# Patient Record
Sex: MALE | Race: WHITE
[De-identification: names, ages, dates, MRNs, and addresses within clinical notes are randomized per-mention and may not be internally consistent; named-entity substitution may affect disease eponyms.]

---

## 2017-03-14 ENCOUNTER — HOSPITAL ENCOUNTER (OUTPATIENT)
Dept: HOSPITAL 61 - PCVCCLINIC | Age: 72
Discharge: HOME | End: 2017-03-14
Attending: INTERNAL MEDICINE
Payer: MEDICARE

## 2017-03-14 DIAGNOSIS — I25.10: Primary | ICD-10-CM

## 2017-03-14 DIAGNOSIS — E78.5: ICD-10-CM

## 2017-03-14 DIAGNOSIS — I10: ICD-10-CM

## 2017-03-14 DIAGNOSIS — E11.9: ICD-10-CM

## 2017-03-14 DIAGNOSIS — M19.90: ICD-10-CM

## 2017-03-14 DIAGNOSIS — I65.29: ICD-10-CM

## 2017-03-14 PROCEDURE — G0463 HOSPITAL OUTPT CLINIC VISIT: HCPCS

## 2017-03-14 PROCEDURE — 93005 ELECTROCARDIOGRAM TRACING: CPT

## 2017-09-26 ENCOUNTER — HOSPITAL ENCOUNTER (OUTPATIENT)
Dept: HOSPITAL 61 - PCVCCLINIC | Age: 72
Discharge: HOME | End: 2017-09-26
Attending: INTERNAL MEDICINE
Payer: MEDICARE

## 2017-09-26 DIAGNOSIS — Z77.098: ICD-10-CM

## 2017-09-26 DIAGNOSIS — I10: ICD-10-CM

## 2017-09-26 DIAGNOSIS — I65.23: ICD-10-CM

## 2017-09-26 DIAGNOSIS — Z79.84: ICD-10-CM

## 2017-09-26 DIAGNOSIS — E11.9: ICD-10-CM

## 2017-09-26 DIAGNOSIS — I25.10: Primary | ICD-10-CM

## 2017-09-26 DIAGNOSIS — M19.90: ICD-10-CM

## 2017-09-26 DIAGNOSIS — Z79.899: ICD-10-CM

## 2017-09-26 DIAGNOSIS — Z87.891: ICD-10-CM

## 2017-09-26 DIAGNOSIS — I77.9: ICD-10-CM

## 2017-09-26 DIAGNOSIS — E78.2: ICD-10-CM

## 2017-09-26 DIAGNOSIS — I73.00: ICD-10-CM

## 2017-09-26 DIAGNOSIS — Z79.82: ICD-10-CM

## 2017-09-26 PROCEDURE — G0463 HOSPITAL OUTPT CLINIC VISIT: HCPCS

## 2017-09-26 PROCEDURE — 93005 ELECTROCARDIOGRAM TRACING: CPT

## 2018-03-27 ENCOUNTER — HOSPITAL ENCOUNTER (OUTPATIENT)
Dept: HOSPITAL 61 - PCVCCLINIC | Age: 73
Discharge: HOME | End: 2018-03-27
Attending: INTERNAL MEDICINE
Payer: MEDICARE

## 2018-03-27 DIAGNOSIS — I25.119: Primary | ICD-10-CM

## 2018-03-27 DIAGNOSIS — Z79.899: ICD-10-CM

## 2018-03-27 DIAGNOSIS — I10: ICD-10-CM

## 2018-03-27 DIAGNOSIS — Z79.84: ICD-10-CM

## 2018-03-27 DIAGNOSIS — Z87.891: ICD-10-CM

## 2018-03-27 DIAGNOSIS — E11.9: ICD-10-CM

## 2018-03-27 DIAGNOSIS — I65.23: ICD-10-CM

## 2018-03-27 DIAGNOSIS — E78.2: ICD-10-CM

## 2018-03-27 DIAGNOSIS — Z79.82: ICD-10-CM

## 2018-03-27 DIAGNOSIS — I73.00: ICD-10-CM

## 2018-03-27 DIAGNOSIS — Z77.098: ICD-10-CM

## 2018-03-27 PROCEDURE — 80061 LIPID PANEL: CPT

## 2018-03-27 PROCEDURE — 93005 ELECTROCARDIOGRAM TRACING: CPT

## 2018-10-25 ENCOUNTER — HOSPITAL ENCOUNTER (OUTPATIENT)
Dept: HOSPITAL 61 - PCVCIMAG | Age: 73
Discharge: HOME | End: 2018-10-25
Attending: INTERNAL MEDICINE
Payer: MEDICARE

## 2018-10-25 DIAGNOSIS — I25.10: Primary | ICD-10-CM

## 2018-10-25 DIAGNOSIS — Z77.098: ICD-10-CM

## 2018-10-25 DIAGNOSIS — E11.9: ICD-10-CM

## 2018-10-25 DIAGNOSIS — I73.00: ICD-10-CM

## 2018-10-25 DIAGNOSIS — Z79.82: ICD-10-CM

## 2018-10-25 DIAGNOSIS — I65.23: ICD-10-CM

## 2018-10-25 DIAGNOSIS — Z79.84: ICD-10-CM

## 2018-10-25 DIAGNOSIS — I10: ICD-10-CM

## 2018-10-25 DIAGNOSIS — Z79.899: ICD-10-CM

## 2018-10-25 DIAGNOSIS — E78.2: ICD-10-CM

## 2018-10-25 DIAGNOSIS — Z87.891: ICD-10-CM

## 2018-10-25 PROCEDURE — 93017 CV STRESS TEST TRACING ONLY: CPT

## 2018-10-25 PROCEDURE — G0463 HOSPITAL OUTPT CLINIC VISIT: HCPCS

## 2018-10-25 PROCEDURE — 78452 HT MUSCLE IMAGE SPECT MULT: CPT

## 2018-10-25 PROCEDURE — 93005 ELECTROCARDIOGRAM TRACING: CPT

## 2018-10-25 PROCEDURE — A9500 TC99M SESTAMIBI: HCPCS

## 2018-10-25 NOTE — PCVCIMAG
--------------- APPROVED REPORT --------------





Imaging Protocol: Rest Tc-99m/Stress Tc-99m 1 day

Study performed:  10/25/2018 13:34:30



Indication: CAD

Patient Location: Out-Patient

Stress Nurse: Dayanna Perea RN

NM Tech:MARIANELA Casey



Ht: 5 ft 10 in Wt: 215 lbs BSA:  2.15 m2

HR: 78 bpm                      BP: 132/76 mmHg         BMI:  

30.8

Rhythm:  Sinus Rhythm, ST & T abnormalities



Medical History

Medications: Amlodipine, ASA, Atorvastatin, Lisinopril, Metformin, 

Metoprolol, Omeprazole

Allergies: No known drug allergies

Cardiac Risk Factors: Age, HTN, Hyperlipidemia, DM, Tobacco History 

(Former)

Previous Cardiac Procedures: PCI - Circ

Pretest Chest Pain Characteristics: No chest pain

Physical Disabilities: Breathing, patient refuses Zachary protocol

Meds Held (24 hrs): Metoprolol



Resting Data

Rest SPECT myocardial perfusion imaging was performed in supine 

position 45 minutes following the intravenous injection of 11.8 mCi 

of Tc-99m Sestamibi.

Time of rest injection: 1250     Date: 10/25/2018

Administration Route: IV

Administration Site: Right AC



Pharmacologic Stress

Pharmacologic stress test was performed by injecting Regadenoson 0.4 

mg IV push over 10-15 seconds immediately followed by the intravenous 

injection of 32.8 mCi of Tc-99m Sestamibi.

Time of stress injection: 1410     Date: 10/25/2018

Administration Route: IV

Administration Site: Right AC

Gated Stress SPECT was performed 45 minutes after stress 

injection.

The images were gated to evaluate regional wall motion and calculate 

left ventricular ejection fraction. 



Stress Test Details

Stress Test:  Pharmacologic stress was paired with low level 

exercise.

  Reason for pharmacologic stress test: Breathing.



HRMax Heart Rate (APMHR): 147 bpm 

Resting HR:            78 bpmTarget HR (85% APMHR): 124 bpm

Max HR Achieved:  108 bpm

% of APMHR:         73

Recovery HR:            79 bpm



BP

Resting BP:  132/76 mmHg



Recovery BP:       125/63 mmHg

ECG

Resting ECG:  Sinus Rhythm, nonspecific ST-T 

abnormalities

Stress ECG:     Sinus Tachycardia,  nonspecific ST-T 

abnormalities

ST Change: None

Maximum ST Deviation: 0 mm

Arrhythmia:    None

Recovery ECG: Sinus Rhythm, nonspecific ST-T 

abnormalities

Recovery ST Change: None

Recovery ST Deviation: 0 mm

Recovery Arrhythmia: None



Clinical

Reason for Termination: Completed protocol

Stress Symptoms: Dyspnea, Lightheaded

Exercise duration: 4 min 00 sec

Symptoms resolved during recovery.



Stress ECG Conclusion

ECG: Non-ischemic

Clinical: Non-ischemic



Study Quality

Study: Good



Study Data

Post stress, the left ventricular ejection was 26%..

SSS: 1

SRS: 0

SDS: 1

TID = 1.18.



Perfusion

No evidence of stress induced ischemia or prior myocardial 

infarction.



Wall Motion

Severely decreased left ventricular systolic function.



Nuclear Conclusion

No evidence of stress induced ischemia or prior myocardial 

infarction.

Post stress, the left ventricular ejection was 26%. 

No prior study available for comparison.



Interpreted by:  Yves Deng MD

Electronically Approved: 10/25/2018 

15:49:08



&lt;Conclusion&gt;

ECG: Non-ischemic

Clinical: Non-ischemic

## 2019-05-14 ENCOUNTER — HOSPITAL ENCOUNTER (OUTPATIENT)
Dept: HOSPITAL 61 - PCVCIMAG | Age: 74
Discharge: HOME | End: 2019-05-14
Attending: INTERNAL MEDICINE
Payer: MEDICARE

## 2019-05-14 DIAGNOSIS — I73.00: ICD-10-CM

## 2019-05-14 DIAGNOSIS — I34.0: Primary | ICD-10-CM

## 2019-05-14 DIAGNOSIS — I25.10: ICD-10-CM

## 2019-05-14 DIAGNOSIS — E78.2: ICD-10-CM

## 2019-05-14 DIAGNOSIS — Z87.891: ICD-10-CM

## 2019-05-14 DIAGNOSIS — I10: ICD-10-CM

## 2019-05-14 DIAGNOSIS — I65.23: ICD-10-CM

## 2019-05-14 DIAGNOSIS — Z77.098: ICD-10-CM

## 2019-05-14 DIAGNOSIS — E11.9: ICD-10-CM

## 2019-05-14 PROCEDURE — 93005 ELECTROCARDIOGRAM TRACING: CPT

## 2019-05-14 PROCEDURE — G0463 HOSPITAL OUTPT CLINIC VISIT: HCPCS

## 2019-05-14 PROCEDURE — 93306 TTE W/DOPPLER COMPLETE: CPT

## 2019-05-14 NOTE — [facility]AG
--------------- APPROVED REPORT --------------





Study performed:  05/14/2019 14:22:41



EXAM: Comprehensive 2D, Doppler, and color-flow 

Echocardiogram



BSA:         2.15

HR: 64 bpmBP:          112/74 mmHg

Rhythm: NSR



Other Information 

Study Quality: Technically Difficult



Risk Factors: 

Cardiac Risk Factors:  HTN, 

Hyperlipidemia



Indications

Diabetes

CAD

Raynauds



2D Dimensions

IVSd:  12.98 (7-11mm)LVOT Diam:  20.29 (18-24mm) 

LVDd:  40.59 mm

PWd:  12.59 (7-11mm)Ascending Ao:  32.80 (22-36mm)

LVDs:  33.76 (25-40mm)

Left Atrium:  34.35 (27-40mm)

Aortic Root:  38.68 mm

LV Single Plane 4CH:  60.39 %

LV Single Plane 2CH:  64.12 %

Biplane EF:  62.2 %



Volumes

Left Atrial Volume (Systole)

Single Plane 4CH:  49.98 mLSingle Plane 2CH:  38.61 mL

LA ESV Index:  21.00 mL/m2



Aortic Valve

AoV Peak Babak.:  1.54 m/s

AO Peak Gr.:  9.47 mmHgLVOT Max PG:  3.97 mmHg

LVOT Max V:  1.00 m/s

MORGAN Vmax: 2.09 cm2



Mitral Valve

E/A Ratio:  0.7

MV Decel. Time:  166.61 ms

MV E Max Babak.:  0.76 m/s

MV A Babak.:  1.04 m/s



TDI

E/Lateral E':  12.67E/Medial E':  12.67

Medial E' Babak.:  0.06 m/s

Lateral E' Babak.:  0.06 m/s



Pulmonary Valve

PV Peak Gr.:  1.67 mmHg



Pulmonary Vein

P Vein S:    0.63 m/sP Vein A:  0.38 m/s

P Vein D:   0.46 m/sP Vein A Dur.:  107.3 msec

P Vein S/D Ratio:  1.37



Left Ventricle

The left ventricle is normal size. There is normal LV segmental wall 

motion. Mild to moderate concentric left ventricular hypertrophy. 

Left ventricular systolic function is normal. The left ventricular 

ejection fraction is within the normal range. LVEF is 55-60%. Mild 

diastolic dysfunction is present (impaired relaxation 

pattern).



Right Ventricle

The right ventricle is normal size. The right ventricular systolic 

function is normal.



Atria

The left atrium size is normal. The right atrium size is 

normal.



Aortic Valve

The aortic valve is normal in structure. No aortic regurgitation is 

present. There is no aortic valvular stenosis.



Mitral Valve

The mitral valve is normal in structure. Mild mitral regurgitation. 

No evidence of mitral valve stenosis.



Tricuspid Valve

The tricuspid valve is normal in structure. There is no tricuspid 

valve regurgitation noted.



Pulmonic Valve

The pulmonary valve is normal in structure. Trace pulmonic 

regurgitation.



Great Vessels

The aortic root is normal in size. IVC is normal in size and 

collapses >50% with inspiration.



Pericardium

There is no pericardial effusion.



<Conclusion>

Left ventricular systolic function is normal.

There is normal LV segmental wall motion.

LVEF 55-60%. Mild diastolic dysfunction.

The aortic valve is normal in structure. No aortic regurgitation or 

stenosis

The mitral valve is normal in structure. Mild mitral 

regurgitation.

Pulmonary artery pressure could not be reliably ascertained

There is no pericardial effusion.

## 2019-11-19 ENCOUNTER — HOSPITAL ENCOUNTER (OUTPATIENT)
Dept: HOSPITAL 61 - PCVCCLINIC | Age: 74
Discharge: HOME | End: 2019-11-19
Attending: INTERNAL MEDICINE
Payer: MEDICARE

## 2019-11-19 DIAGNOSIS — I25.10: Primary | ICD-10-CM

## 2019-11-19 PROCEDURE — 80061 LIPID PANEL: CPT

## 2019-11-19 PROCEDURE — G0463 HOSPITAL OUTPT CLINIC VISIT: HCPCS

## 2019-11-19 PROCEDURE — 36415 COLL VENOUS BLD VENIPUNCTURE: CPT

## 2019-11-19 PROCEDURE — 93005 ELECTROCARDIOGRAM TRACING: CPT

## 2020-05-19 ENCOUNTER — HOSPITAL ENCOUNTER (OUTPATIENT)
Dept: HOSPITAL 35 - SJCVC | Age: 75
End: 2020-05-19
Attending: INTERNAL MEDICINE
Payer: COMMERCIAL

## 2020-05-19 DIAGNOSIS — Z87.891: ICD-10-CM

## 2020-05-19 DIAGNOSIS — I10: ICD-10-CM

## 2020-05-19 DIAGNOSIS — E78.2: ICD-10-CM

## 2020-05-19 DIAGNOSIS — Z79.899: ICD-10-CM

## 2020-05-19 DIAGNOSIS — I65.23: ICD-10-CM

## 2020-05-19 DIAGNOSIS — Z79.82: ICD-10-CM

## 2020-05-19 DIAGNOSIS — I73.00: ICD-10-CM

## 2020-05-19 DIAGNOSIS — Z77.098: ICD-10-CM

## 2020-05-19 DIAGNOSIS — I25.10: Primary | ICD-10-CM

## 2020-05-19 DIAGNOSIS — M19.90: ICD-10-CM

## 2020-05-19 DIAGNOSIS — Z79.84: ICD-10-CM

## 2020-05-19 DIAGNOSIS — E11.9: ICD-10-CM

## 2021-01-12 ENCOUNTER — HOSPITAL ENCOUNTER (OUTPATIENT)
Dept: HOSPITAL 35 - SJCVCIMAG | Age: 76
End: 2021-01-12
Attending: INTERNAL MEDICINE
Payer: COMMERCIAL

## 2021-01-12 DIAGNOSIS — M19.90: ICD-10-CM

## 2021-01-12 DIAGNOSIS — Z79.82: ICD-10-CM

## 2021-01-12 DIAGNOSIS — Z77.098: ICD-10-CM

## 2021-01-12 DIAGNOSIS — Z79.84: ICD-10-CM

## 2021-01-12 DIAGNOSIS — E11.9: ICD-10-CM

## 2021-01-12 DIAGNOSIS — I25.10: ICD-10-CM

## 2021-01-12 DIAGNOSIS — I10: ICD-10-CM

## 2021-01-12 DIAGNOSIS — I73.00: ICD-10-CM

## 2021-01-12 DIAGNOSIS — E78.2: ICD-10-CM

## 2021-01-12 DIAGNOSIS — I49.3: Primary | ICD-10-CM

## 2021-01-12 DIAGNOSIS — E78.00: ICD-10-CM

## 2021-01-12 DIAGNOSIS — Z87.891: ICD-10-CM

## 2021-01-12 DIAGNOSIS — Z79.899: ICD-10-CM

## 2021-07-27 ENCOUNTER — HOSPITAL ENCOUNTER (OUTPATIENT)
Dept: HOSPITAL 35 - SJCVCIMAG | Age: 76
End: 2021-07-27
Attending: INTERNAL MEDICINE
Payer: COMMERCIAL

## 2021-07-27 DIAGNOSIS — I65.23: ICD-10-CM

## 2021-07-27 DIAGNOSIS — I37.1: Primary | ICD-10-CM

## 2021-07-27 DIAGNOSIS — Z79.82: ICD-10-CM

## 2021-07-27 DIAGNOSIS — Z87.891: ICD-10-CM

## 2021-07-27 DIAGNOSIS — Z77.098: ICD-10-CM

## 2021-07-27 DIAGNOSIS — I25.10: ICD-10-CM

## 2021-07-27 DIAGNOSIS — E11.9: ICD-10-CM

## 2021-07-27 DIAGNOSIS — I45.10: ICD-10-CM

## 2021-07-27 DIAGNOSIS — E78.2: ICD-10-CM

## 2021-07-27 DIAGNOSIS — Z79.899: ICD-10-CM

## 2021-07-27 DIAGNOSIS — Z72.89: ICD-10-CM

## 2021-07-27 DIAGNOSIS — I10: ICD-10-CM

## 2021-07-27 DIAGNOSIS — I73.00: ICD-10-CM

## 2022-02-01 ENCOUNTER — HOSPITAL ENCOUNTER (OUTPATIENT)
Dept: HOSPITAL 35 - SJCVC | Age: 77
Discharge: HOME | End: 2022-02-01
Attending: INTERNAL MEDICINE
Payer: COMMERCIAL

## 2022-02-01 DIAGNOSIS — Z79.84: ICD-10-CM

## 2022-02-01 DIAGNOSIS — Z87.891: ICD-10-CM

## 2022-02-01 DIAGNOSIS — I65.29: ICD-10-CM

## 2022-02-01 DIAGNOSIS — I73.00: ICD-10-CM

## 2022-02-01 DIAGNOSIS — E11.9: ICD-10-CM

## 2022-02-01 DIAGNOSIS — I25.10: Primary | ICD-10-CM

## 2022-02-01 DIAGNOSIS — I10: ICD-10-CM

## 2022-02-01 DIAGNOSIS — Z79.82: ICD-10-CM

## 2022-02-01 DIAGNOSIS — E78.00: ICD-10-CM

## 2022-02-01 DIAGNOSIS — Z77.098: ICD-10-CM

## 2022-02-01 DIAGNOSIS — I65.23: ICD-10-CM

## 2022-02-01 DIAGNOSIS — Z79.899: ICD-10-CM

## 2022-02-01 DIAGNOSIS — E78.2: ICD-10-CM
